# Patient Record
Sex: MALE
[De-identification: names, ages, dates, MRNs, and addresses within clinical notes are randomized per-mention and may not be internally consistent; named-entity substitution may affect disease eponyms.]

---

## 2024-02-20 PROBLEM — Z00.00 ENCOUNTER FOR PREVENTIVE HEALTH EXAMINATION: Status: ACTIVE | Noted: 2024-02-20

## 2024-02-27 ENCOUNTER — APPOINTMENT (OUTPATIENT)
Dept: OTOLARYNGOLOGY | Facility: CLINIC | Age: 24
End: 2024-02-27
Payer: COMMERCIAL

## 2024-02-27 DIAGNOSIS — Z78.9 OTHER SPECIFIED HEALTH STATUS: ICD-10-CM

## 2024-02-27 DIAGNOSIS — Z82.5 FAMILY HISTORY OF ASTHMA AND OTHER CHRONIC LOWER RESPIRATORY DISEASES: ICD-10-CM

## 2024-02-27 DIAGNOSIS — R59.0 LOCALIZED ENLARGED LYMPH NODES: ICD-10-CM

## 2024-02-27 DIAGNOSIS — Z87.09 PERSONAL HISTORY OF OTHER DISEASES OF THE RESPIRATORY SYSTEM: ICD-10-CM

## 2024-02-27 PROCEDURE — 99203 OFFICE O/P NEW LOW 30 MIN: CPT

## 2024-02-27 RX ORDER — ALBUTEROL 90 MCG
AEROSOL (GRAM) INHALATION
Refills: 0 | Status: ACTIVE | COMMUNITY

## 2024-02-27 RX ORDER — ESCITALOPRAM OXALATE 5 MG/1
TABLET, FILM COATED ORAL
Refills: 0 | Status: ACTIVE | COMMUNITY

## 2024-02-27 RX ORDER — BUTALBITAL, ACETAMINOPHEN AND CAFFEINE 325; 50; 40 MG/1; MG/1; MG/1
TABLET ORAL
Refills: 0 | Status: ACTIVE | COMMUNITY

## 2024-03-04 PROBLEM — R59.0 PAROTID LYMPHADENOPATHY: Status: ACTIVE | Noted: 2024-03-04

## 2024-03-04 NOTE — ASSESSMENT
[FreeTextEntry1] : I explained to him that the left intraparotid lymph node is incidental. I reviewed the film myself and reviewed with him. No further workup is required. What he is palpating is an unrelated lesion on the right that is clinically consistent with a reactive lymph node. Reassurance provided that no further workup or treatment required.

## 2024-03-04 NOTE — HISTORY OF PRESENT ILLNESS
[de-identified] : Initial visit, referred in consultation. His chief complaint is "inflamed lymph node". He describes as being present for 2 years.  He underwent an MRI of his brain for a workup for headaches.  There was an incidental finding of a 12 x 5 mm left intraparotid lymph node.  In fact when discussing with him why he presented he was complaining of a lymph node underneath his right mandible that is been present for 2 years and comes and goes. No constitutional symptoms.